# Patient Record
Sex: FEMALE | Race: WHITE | NOT HISPANIC OR LATINO | Employment: OTHER | URBAN - NONMETROPOLITAN AREA
[De-identification: names, ages, dates, MRNs, and addresses within clinical notes are randomized per-mention and may not be internally consistent; named-entity substitution may affect disease eponyms.]

---

## 2021-07-09 ENCOUNTER — APPOINTMENT (OUTPATIENT)
Dept: GENERAL RADIOLOGY | Facility: HOSPITAL | Age: 66
End: 2021-07-09

## 2021-07-09 ENCOUNTER — HOSPITAL ENCOUNTER (OUTPATIENT)
Facility: HOSPITAL | Age: 66
Setting detail: OBSERVATION
Discharge: HOME | End: 2021-07-10
Attending: FAMILY MEDICINE | Admitting: FAMILY MEDICINE

## 2021-07-09 PROBLEM — R07.9 CHEST PAIN: Status: RESOLVED | Noted: 2021-07-09 | Resolved: 2021-07-09

## 2021-07-09 PROBLEM — R77.8 ELEVATED TROPONIN: Status: ACTIVE | Noted: 2021-07-09

## 2021-07-09 PROBLEM — K29.60 REFLUX GASTRITIS: Status: ACTIVE | Noted: 2021-07-09

## 2021-07-09 PROBLEM — R01.1 HEART MURMUR: Status: ACTIVE | Noted: 2021-07-09

## 2021-07-09 PROBLEM — R07.9 CHEST PAIN: Status: ACTIVE | Noted: 2021-07-09

## 2021-07-09 PROBLEM — D64.9 SYMPTOMATIC ANEMIA: Status: ACTIVE | Noted: 2021-07-09

## 2021-07-09 LAB
ALBUMIN SERPL-MCNC: 4 G/DL (ref 3.5–5.2)
ALBUMIN/GLOB SERPL: 1.5 G/DL
ALP SERPL-CCNC: 102 U/L (ref 39–117)
ALT SERPL W P-5'-P-CCNC: 32 U/L (ref 1–33)
ANION GAP SERPL CALCULATED.3IONS-SCNC: 9 MMOL/L (ref 5–15)
AST SERPL-CCNC: 39 U/L (ref 1–32)
BASOPHILS # BLD AUTO: 0.06 10*3/MM3 (ref 0–0.2)
BASOPHILS NFR BLD AUTO: 1 % (ref 0–1.5)
BILIRUB SERPL-MCNC: 0.4 MG/DL (ref 0–1.2)
BUN SERPL-MCNC: 21 MG/DL (ref 8–23)
BUN/CREAT SERPL: 19.8 (ref 7–25)
CALCIUM SPEC-SCNC: 8.9 MG/DL (ref 8.6–10.5)
CHLORIDE SERPL-SCNC: 100 MMOL/L (ref 98–107)
CO2 SERPL-SCNC: 28 MMOL/L (ref 22–29)
CREAT SERPL-MCNC: 1.06 MG/DL (ref 0.57–1)
DEPRECATED RDW RBC AUTO: 52.4 FL (ref 37–54)
EOSINOPHIL # BLD AUTO: 0.12 10*3/MM3 (ref 0–0.4)
EOSINOPHIL NFR BLD AUTO: 2 % (ref 0.3–6.2)
ERYTHROCYTE [DISTWIDTH] IN BLOOD BY AUTOMATED COUNT: 18.4 % (ref 12.3–15.4)
GFR SERPL CREATININE-BSD FRML MDRD: 52 ML/MIN/1.73
GLOBULIN UR ELPH-MCNC: 2.7 GM/DL
GLUCOSE SERPL-MCNC: 85 MG/DL (ref 65–99)
HCT VFR BLD AUTO: 33.2 % (ref 34–46.6)
HGB BLD-MCNC: 10.6 G/DL (ref 12–15.9)
IMM GRANULOCYTES # BLD AUTO: 0.03 10*3/MM3 (ref 0–0.05)
IMM GRANULOCYTES NFR BLD AUTO: 0.5 % (ref 0–0.5)
LYMPHOCYTES # BLD AUTO: 1.46 10*3/MM3 (ref 0.7–3.1)
LYMPHOCYTES NFR BLD AUTO: 24.7 % (ref 19.6–45.3)
MCH RBC QN AUTO: 27.8 PG (ref 26.6–33)
MCHC RBC AUTO-ENTMCNC: 31.9 G/DL (ref 31.5–35.7)
MCV RBC AUTO: 87.1 FL (ref 79–97)
MONOCYTES # BLD AUTO: 0.57 10*3/MM3 (ref 0.1–0.9)
MONOCYTES NFR BLD AUTO: 9.6 % (ref 5–12)
NEUTROPHILS NFR BLD AUTO: 3.67 10*3/MM3 (ref 1.7–7)
NEUTROPHILS NFR BLD AUTO: 62.2 % (ref 42.7–76)
NRBC BLD AUTO-RTO: 0 /100 WBC (ref 0–0.2)
PLATELET # BLD AUTO: 269 10*3/MM3 (ref 140–450)
PMV BLD AUTO: 10.6 FL (ref 6–12)
POTASSIUM SERPL-SCNC: 4.3 MMOL/L (ref 3.5–5.2)
PROT SERPL-MCNC: 6.7 G/DL (ref 6–8.5)
RBC # BLD AUTO: 3.81 10*6/MM3 (ref 3.77–5.28)
SODIUM SERPL-SCNC: 137 MMOL/L (ref 136–145)
TROPONIN T SERPL-MCNC: 0.02 NG/ML (ref 0–0.03)
TROPONIN T SERPL-MCNC: 0.03 NG/ML (ref 0–0.03)
WBC # BLD AUTO: 5.91 10*3/MM3 (ref 3.4–10.8)

## 2021-07-09 PROCEDURE — 85025 COMPLETE CBC W/AUTO DIFF WBC: CPT | Performed by: FAMILY MEDICINE

## 2021-07-09 PROCEDURE — 93010 ELECTROCARDIOGRAM REPORT: CPT | Performed by: INTERNAL MEDICINE

## 2021-07-09 PROCEDURE — G0378 HOSPITAL OBSERVATION PER HR: HCPCS

## 2021-07-09 PROCEDURE — 80053 COMPREHEN METABOLIC PANEL: CPT | Performed by: FAMILY MEDICINE

## 2021-07-09 PROCEDURE — G0379 DIRECT REFER HOSPITAL OBSERV: HCPCS

## 2021-07-09 PROCEDURE — 93005 ELECTROCARDIOGRAM TRACING: CPT | Performed by: FAMILY MEDICINE

## 2021-07-09 PROCEDURE — 99204 OFFICE O/P NEW MOD 45 MIN: CPT | Performed by: INTERNAL MEDICINE

## 2021-07-09 PROCEDURE — 71046 X-RAY EXAM CHEST 2 VIEWS: CPT

## 2021-07-09 PROCEDURE — 84484 ASSAY OF TROPONIN QUANT: CPT | Performed by: FAMILY MEDICINE

## 2021-07-09 RX ORDER — SODIUM CHLORIDE 0.9 % (FLUSH) 0.9 %
10 SYRINGE (ML) INJECTION AS NEEDED
Status: DISCONTINUED | OUTPATIENT
Start: 2021-07-09 | End: 2021-07-10 | Stop reason: HOSPADM

## 2021-07-09 RX ORDER — SODIUM CHLORIDE 0.9 % (FLUSH) 0.9 %
10 SYRINGE (ML) INJECTION EVERY 12 HOURS SCHEDULED
Status: DISCONTINUED | OUTPATIENT
Start: 2021-07-09 | End: 2021-07-10 | Stop reason: HOSPADM

## 2021-07-09 RX ORDER — FERROUS SULFATE 325(65) MG
325 TABLET ORAL
Status: DISCONTINUED | OUTPATIENT
Start: 2021-07-10 | End: 2021-07-10 | Stop reason: HOSPADM

## 2021-07-09 RX ORDER — POTASSIUM CHLORIDE 750 MG/1
10 TABLET, FILM COATED, EXTENDED RELEASE ORAL 2 TIMES DAILY
COMMUNITY
End: 2021-07-10 | Stop reason: HOSPADM

## 2021-07-09 RX ORDER — CLONAZEPAM 0.5 MG/1
0.5 TABLET ORAL 3 TIMES DAILY PRN
Status: DISCONTINUED | OUTPATIENT
Start: 2021-07-09 | End: 2021-07-10 | Stop reason: HOSPADM

## 2021-07-09 RX ORDER — PANTOPRAZOLE SODIUM 40 MG/1
40 TABLET, DELAYED RELEASE ORAL DAILY
COMMUNITY
End: 2021-07-10 | Stop reason: HOSPADM

## 2021-07-09 RX ORDER — ONDANSETRON 2 MG/ML
4 INJECTION INTRAMUSCULAR; INTRAVENOUS EVERY 6 HOURS PRN
Status: DISCONTINUED | OUTPATIENT
Start: 2021-07-09 | End: 2021-07-10 | Stop reason: HOSPADM

## 2021-07-09 RX ORDER — LISINOPRIL 5 MG/1
5 TABLET ORAL DAILY
Status: DISCONTINUED | OUTPATIENT
Start: 2021-07-10 | End: 2021-07-10 | Stop reason: HOSPADM

## 2021-07-09 RX ORDER — LISINOPRIL 5 MG/1
5 TABLET ORAL DAILY
Status: ON HOLD | COMMUNITY
End: 2021-07-10 | Stop reason: SDUPTHER

## 2021-07-09 RX ORDER — FAMOTIDINE 20 MG/1
20 TABLET, FILM COATED ORAL 2 TIMES DAILY
Status: DISCONTINUED | OUTPATIENT
Start: 2021-07-09 | End: 2021-07-10 | Stop reason: HOSPADM

## 2021-07-09 RX ORDER — FERROUS SULFATE 325(65) MG
325 TABLET ORAL
Status: ON HOLD | COMMUNITY
End: 2021-07-10 | Stop reason: SDUPTHER

## 2021-07-09 RX ORDER — FUROSEMIDE 20 MG/1
20 TABLET ORAL 2 TIMES DAILY
COMMUNITY
End: 2021-07-10 | Stop reason: HOSPADM

## 2021-07-09 RX ORDER — ASPIRIN 81 MG/1
81 TABLET ORAL DAILY
Status: DISCONTINUED | OUTPATIENT
Start: 2021-07-09 | End: 2021-07-10 | Stop reason: HOSPADM

## 2021-07-09 RX ORDER — LORATADINE 10 MG/1
10 CAPSULE, LIQUID FILLED ORAL DAILY
COMMUNITY

## 2021-07-09 RX ORDER — CLONAZEPAM 0.5 MG/1
0.5 TABLET ORAL 3 TIMES DAILY PRN
COMMUNITY

## 2021-07-09 RX ADMIN — SODIUM CHLORIDE, PRESERVATIVE FREE 10 ML: 5 INJECTION INTRAVENOUS at 20:34

## 2021-07-09 RX ADMIN — FAMOTIDINE 20 MG: 20 TABLET, FILM COATED ORAL at 20:35

## 2021-07-09 RX ADMIN — METOPROLOL TARTRATE 12.5 MG: 25 TABLET, FILM COATED ORAL at 20:35

## 2021-07-09 NOTE — H&P
[facility] Medicine Services  HISTORY AND PHYSICAL    Date of Admission: 7/9/2021  Primary Care Physician: Provider, No Known    Subjective     Chief Complaint: Chest pain/elevated troponin/anemia    History of Present Illness  Patient is a 66-year-old  female transferred from [facility] for management of chest pain/elevated troponin/anemia.  Patient was accepted by Dr. Torres and were asked to admit the patient.  Status post type and crossmatch and transfuse 2 units that [facility] ER.    Patient stated that the live in  fifth wheels with her  and pets.  Patient is been traveling from [place] to [place] and plans to go to [place] next.  Patient has sign symptom of shortness of breath/weakness.  Patient went to the ER at [facility] to be evaluated, patient has a chronic history anemia.  Hemoglobin ER was 7.1 patient was admitted to the hospital for further evaluation Hemoccult was negative then.  Patient got 2 units of blood.  Patient still remain weak and shortness of breath upon ambulating.  The reason for the transfer of any chest pain/elevated troponin.  Patient currently denies any chest pain or abdomen pain.  Troponin was 0.07 at [facility].    Patient is a chronic smoker smoke a pack a week.    Laboratory showed troponin 0.07, Hemoccult negative, white blood cell 6.1, hemoglobin 7.1- After blood transfusion 10.5., hematocrit 33, platelet 224, sodium is 139, potassium is 3.8, chloride is 103, CO2 is 26, BUN is 19, creatinine is 1.1, GFR is 51, calcium is 9, albumin 3.5, ALT 35, AST 39, total bilirubin .6, total protein 6.2, PTT 22, PT 12, INR is 1.14, .  pH 7.41, PCO2 43, PO2 72, HCO3 27.    Covid-19-not detected.  CTA of the chest-no mediastinal or hilar or axillary lymph node, calcified granuloma in the right lower lobe, lung otherwise clear with no addition pulmonary nodule or mass consolidation or infiltrate or pleural  effusion or pneumothorax.   CT scan abdomen pelvic-image of abdomen pelvic-liver and gallbladder and kidneys and adrenal and spleen and pancreas have normal CT appearance, aortic containing atherosclerotic calcification without evidence of aneurysm, uterus is surgically absent, no dilated loop of small bowel, no free air or abscess or free fluid.  Chest x-ray-no acute CHF with minimally bilateral pleural effusion.  Ultrasound evaluation urinary bladder-normal ultrasound evaluation urinary bladder.    Review of Systems   Constitutional: Positive for activity change, appetite change and fatigue. Negative for chills and fever.   HENT: Negative for hearing loss, nosebleeds, tinnitus and trouble swallowing.    Eyes: Negative for visual disturbance.   Respiratory: Negative for cough, chest tightness, shortness of breath and wheezing.    Cardiovascular: Negative for chest pain, palpitations and leg swelling.   Gastrointestinal: Negative for abdominal distention, abdominal pain, blood in stool, constipation, diarrhea, nausea and vomiting.   Endocrine: Negative for cold intolerance, heat intolerance, polydipsia, polyphagia and polyuria.   Genitourinary: Negative for decreased urine volume, difficulty urinating, dysuria, flank pain, frequency and hematuria.   Musculoskeletal: Negative for arthralgias, joint swelling and myalgias.   Skin: Negative for rash.   Allergic/Immunologic: Negative for immunocompromised state.   Neurological: Negative for dizziness, syncope, weakness, light-headedness and headaches.   Hematological: Negative for adenopathy. Does not bruise/bleed easily.   Psychiatric/Behavioral: Negative for confusion and sleep disturbance. The patient is not nervous/anxious.         Otherwise complete ROS reviewed and negative except as mentioned in the HPI.      Past Medical History:   Past Medical History:   Diagnosis Date   • Anemia    • Anxiety    • CHF (congestive heart failure) (CMS/HCC)    • GERD  "(gastroesophageal reflux disease)    • History of transfusion    • Hypertension        Past Surgical History:  Past Surgical History:   Procedure Laterality Date   • HYSTERECTOMY         Family History: family history includes Neurodegenerative disease in his mother; No Known Problems in his father.    Social History:  reports that he has been smoking. He has been smoking about 0.50 packs per day. He does not have any smokeless tobacco history on file. He reports current alcohol use. He reports that he does not use drugs.    Medications:  Prior to Admission medications    Medication Sig Start Date End Date Taking? Authorizing Provider   clonazePAM (KlonoPIN) 0.5 MG tablet Take 0.5 mg by mouth 3 (Three) Times a Day As Needed.   Yes Ara Cartagena MD   ferrous sulfate 325 (65 FE) MG tablet Take 325 mg by mouth Daily With Breakfast.   Yes Ara Cartagena MD   furosemide (LASIX) 20 MG tablet Take 20 mg by mouth 2 (Two) Times a Day.   Yes Ara Cartagena MD   lisinopril (PRINIVIL,ZESTRIL) 5 MG tablet Take 5 mg by mouth Daily.   Yes Ara Cartagena MD   Loratadine 10 MG capsule Take 10 mg by mouth Daily.   Yes Ara Cartagena MD   metoprolol tartrate (LOPRESSOR) 25 MG tablet Take 25 mg by mouth 2 (Two) Times a Day.   Yes Ara Cartagena MD   pantoprazole (PROTONIX) 40 MG EC tablet Take 40 mg by mouth Daily.   Yes Ara Cartagena MD   potassium chloride 10 MEQ CR tablet Take 10 mEq by mouth 2 (Two) Times a Day.   Yes Ara Cartagena MD     Allergies:  No Known Allergies    Objective     Vital Signs: /52 (BP Location: Right arm, Patient Position: Lying)   Pulse 72   Temp 97.6 °F (36.4 °C) (Oral)   Resp 18   Ht 160 cm (63\")   Wt 62.8 kg (138 lb 6.4 oz)   SpO2 96%   BMI 24.52 kg/m²   Physical Exam  Vitals and nursing note reviewed.   Constitutional:       Appearance: He is well-developed.   HENT:      Head: Normocephalic and atraumatic.   Eyes:      " Conjunctiva/sclera: Conjunctivae normal.      Pupils: Pupils are equal, round, and reactive to light.   Neck:      Vascular: No JVD.   Cardiovascular:      Rate and Rhythm: Normal rate and regular rhythm.      Heart sounds: Murmur heard.   No friction rub. No gallop.    Pulmonary:      Effort: No respiratory distress.      Breath sounds: No wheezing or rales.      Comments: Decrease in breath sound bilateral.  Chest:      Chest wall: No tenderness.   Abdominal:      General: Bowel sounds are normal. There is no distension.      Palpations: Abdomen is soft.      Tenderness: There is no abdominal tenderness. There is no guarding or rebound.   Musculoskeletal:         General: No tenderness or deformity.      Cervical back: Neck supple.   Skin:     General: Skin is warm and dry.      Capillary Refill: Capillary refill takes 2 to 3 seconds.      Findings: No rash.   Neurological:      Mental Status: He is alert and oriented to person, place, and time.      Cranial Nerves: No cranial nerve deficit.      Motor: Weakness present. No abnormal muscle tone.      Deep Tendon Reflexes: Reflexes normal.   Psychiatric:         Behavior: Behavior normal.         Thought Content: Thought content normal.         Judgment: Judgment normal.             Results Reviewed:    Lab Results (last 24 hours)     ** No results found for the last 24 hours. **           Radiology Data:    Imaging Results (Last 24 Hours)     ** No results found for the last 24 hours. **          I have personally reviewed and interpreted the radiology studies and ECG obtained at time of admission.     Assessment / Plan      Assessment & Plan  Active Hospital Problems    Diagnosis    • Elevated troponin    • Heart murmur    • Symptomatic anemia    • Reflux gastritis      Plan.    Chest pain/shortness of breath/chronic heart murmur/hypertension.  Slightly elevated troponin.  We will continue to trend troponin.  Consult cardiology, reason for transfer, in a.m.   Patient currently denies any chest pain.  Hold Lasix.  Continue low-dose lisinopril.  Continue Lopressor.  Echocardiogram.    Shortness of breath.  Patient currently denies any shortness of breath.  CTA of the chest from [facility] -no mediastinal or hilar or axillary lymph node, calcified granuloma in the right lower lobe, lung otherwise clear with no addition pulmonary nodule or mass consolidation or infiltrate or pleural effusion or pneumothorax.   Chest x-ray from [facility] -no acute CHF with minimally bilateral pleural effusion.  Repeat chest x-ray.    Anemia.  Status post 2 units of blood transfusion 7/7/2021.  No sign of acute bleed.  Hemoccult at [facility] was negative.  Continue iron sulfate.    Abdomen pain/epigastric pain.  Pepcid.  Zofran as needed  CT scan abdomen pelvic from [facility] -image of abdomen pelvic-liver and gallbladder and kidneys and adrenal and spleen and pancreas have normal CT appearance, aortic containing atherosclerotic calcification without evidence of aneurysm, uterus is surgically absent, no dilated loop of small bowel, no free air or abscess or free fluid.  Ultrasound evaluation urinary bladder from [facility] -normal ultrasound evaluation urinary bladder.    Chronic smoker.  Discussed with patient cutting back and stopping.  Nicotine patch for now.    Anxiety.  Clonazepam as needed.    Stat CBC and CMP.  Lab in a.m.  Trend troponin.  Chest x-ray.  Echocardiogram.    Code Status: Full code.  If patient unable make it medical decision her  Sander will make it for her.     I discussed the patient's findings and my recommendations with: Patient.    Estimated length of stay: 1 to 3 days.    Electronically signed by Jorge Ahuja MD, 07/09/21, 4:23 PM CDT.

## 2021-07-09 NOTE — PLAN OF CARE
Goal Outcome Evaluation:  Plan of Care Reviewed With: patient        Progress: no change  Outcome Summary: Pt tranfered from [facility]. She co chest pressure and had abnormal EKG, trop was 0.07. her hgb was 7.1 on admit and she recived 2 units of blood, it is now 10.5. OB stool was neg. cont to monitor.

## 2021-07-09 NOTE — CONSULTS
Consults   Reason for Consultation: Chest pain/elevated troponin.  Reason for transfer.  Requesting Physician: Jorge Ahuja MD    [facility] - Consultation Note    Chief Complaint: Anemia and shortness of breath    HPI: This is a 66-year-old female with a history of valvular heart disease, including mitral valve regurgitation, mitral valve stenosis, aortic valve stenosis, all of mild or mild to moderate, also with a history of severe transfusion dependent anemia, transferred from an outside facility after the transferring provider found an elevated troponin on the labs at this morning.  The patient tells me that she went to an outside hospital because that she knew that she was anemic.  She says that over a period of days or even the past few weeks, she has been more short of breath than usual and having some weakness.  She says that the symptoms got bad enough that she sought medical care.  In the emergency department at the outside hospital, she was found to be anemic and was given 2 units of blood.  The patient still remained weak and somewhat short of breath.  Therefore, a troponin level was checked by the admitting physician at the outside hospital and found to be slightly elevated, therefore I was initially called for a transfer for this patient.  I was told by the referring provider that she was not having any chest pain.  He also said that there has been nothing else found on her work-up other than an elevated troponin of any clinical significance.  Initially, I accepted the patient, however I did question the physician about the checking of a troponin simply for shortness of breath, especially after the patient had just been administered 2 units of blood.  Ultimately, the admission was changed to the hospitalist service.  We are now asked to see the patient.    The patient absolutely denies that she has had any chest pain or chest pressure at any point in time.  She says that she is still short of  breath with exertion but she also tells me that her hemoglobin level still remains lower than what she describes as being her baseline although she cannot recall the exact number of her baseline hemoglobin.  She says that she has noted some melena in the past but not any in the past few days.  She denies nausea, vomiting or abdominal pain.  No orthopnea, PND, edema.  She denies cough or wheezing.  She does smoke about a pack of cigarettes per week and says that occasionally she will have a cough but nothing recently.  She denies having any fevers or chills.    She says that she is currently traveling about the country with her .  She says that when she was living in [place], she had blood transfusion scheduled.  She denies a knowledge of a definitive reason why she is transfusion dependent.  She says that she thinks that she does not make the proper amount of red blood cells.  She does take iron and she says that this sometimes upsets her stomach.  She says that she takes this with food and it seems to do better.    Once again, it should be noted that this patient denies having any chest pain now or during her recent hospital stay at the outside facility.    Past Medical History:   Diagnosis Date   • Anemia    • Anxiety    • CHF (congestive heart failure) (CMS/[facility])    • GERD (gastroesophageal reflux disease)    • History of transfusion    • Hypertension    • Valvular heart disease      Past Surgical History:   Procedure Laterality Date   • HYSTERECTOMY       Prior to Admission medications    Medication Sig Start Date End Date Taking? Authorizing Provider   clonazePAM (KlonoPIN) 0.5 MG tablet Take 0.5 mg by mouth 3 (Three) Times a Day As Needed.   Yes Ara Cartagena MD   ferrous sulfate 325 (65 FE) MG tablet Take 325 mg by mouth Daily With Breakfast.   Yes Ara Cartagena MD   furosemide (LASIX) 20 MG tablet Take 20 mg by mouth 2 (Two) Times a Day.   Yes Ara Cartagena MD   lisinopril  (PRINIVIL,ZESTRIL) 5 MG tablet Take 5 mg by mouth Daily.   Yes Ara Cartagena MD   Loratadine 10 MG capsule Take 10 mg by mouth Daily.   Yes Ara Cartagena MD   metoprolol tartrate (LOPRESSOR) 25 MG tablet Take 25 mg by mouth 2 (Two) Times a Day.   Yes Ara Cartagena MD   pantoprazole (PROTONIX) 40 MG EC tablet Take 40 mg by mouth Daily.   Yes Ara Cartagena MD   potassium chloride 10 MEQ CR tablet Take 10 mEq by mouth 2 (Two) Times a Day.   Yes Ara Cartagena MD     Allergies: No Known Allergies    Social History     Tobacco Use   • Smoking status: Current Every Day Smoker     Packs/day: 0.50   Substance Use Topics   • Alcohol use: Yes     Family History   Problem Relation Age of Onset   • Neurodegenerative disease Mother    • No Known Problems Father      Review of Systems   Constitutional: Positive for malaise/fatigue. Negative for chills, fever and weight loss.   HENT: Negative for congestion and hearing loss.    Eyes: Negative for blurred vision and pain.   Cardiovascular: Positive for dyspnea on exertion. Negative for chest pain, claudication, leg swelling, orthopnea, palpitations, paroxysmal nocturnal dyspnea and syncope.   Respiratory: Positive for shortness of breath. Negative for cough, hemoptysis and wheezing.    Endocrine: Negative for cold intolerance and heat intolerance.   Hematologic/Lymphatic: Negative for adenopathy and bleeding problem.   Skin: Negative for color change, poor wound healing and rash.   Musculoskeletal: Negative for arthritis, myalgias and neck pain.   Gastrointestinal: Positive for melena. Negative for abdominal pain, change in bowel habit, constipation, diarrhea, heartburn, hematochezia, nausea and vomiting.   Genitourinary: Negative for dysuria, frequency, hematuria and nocturia.   Neurological: Positive for weakness. Negative for dizziness, focal weakness, headaches, light-headedness, loss of balance and numbness.   Psychiatric/Behavioral:  "Negative for altered mental status, memory loss and substance abuse.   Allergic/Immunologic: Negative for hives and persistent infections.     Physical Exam:    /52 (BP Location: Right arm, Patient Position: Lying)   Pulse 72   Temp 97.6 °F (36.4 °C) (Oral)   Resp 18   Ht 160 cm (63\")   Wt 62.8 kg (138 lb 6.4 oz)   SpO2 96%   BMI 24.52 kg/m²   Temp:  [97.6 °F (36.4 °C)] 97.6 °F (36.4 °C)  Heart Rate:  [72] 72  Resp:  [18] 18  BP: (114)/(52) 114/52    Vitals reviewed.   Constitutional:       General: Not in acute distress.     Appearance: Normal and healthy appearance. Well-developed. Not toxic-appearing or diaphoretic.   Eyes:      General: Lids are normal.      Extraocular Movements: Extraocular movements intact.      Pupils: Pupils are equal, round, and reactive to light.   HENT:      Head: Normocephalic and atraumatic.      Right Ear: External ear normal.      Left Ear: External ear normal.      Nose: Nose normal.    Mouth/Throat:      Mouth: Mucous membranes are not pale, not dry and not cyanotic.      Pharynx: Uvula midline. No oropharyngeal exudate.   Neck:      Thyroid: No thyroid mass or thyromegaly.      Vascular: No carotid bruit, hepatojugular reflux or JVD.      Trachea: No tracheal deviation.      Lymphadenopathy: No cervical adenopathy.   Pulmonary:      Effort: Pulmonary effort is normal. No accessory muscle usage or respiratory distress.      Breath sounds: Decreased breath sounds present.      Comments: Decreased bilaterally but otherwise clear without wheezes, rhonchi, rales  Chest:      Chest wall: Not tender to palpatation.   Cardiovascular:      Normal rate. Regular rhythm.      Murmurs: There is a grade 2/6 high frequency blowing holosystolic murmur at the apex. There is also a grade 2/6 harsh midsystolic murmur at the URSB.      No gallop.   Pulses:     Intact distal pulses.   Edema:     Peripheral edema absent.   Abdominal:      General: Bowel sounds are normal. There is no " distension or abdominal bruit.      Palpations: Abdomen is soft. There is no pulsatile midline mass.      Tenderness: There is no abdominal tenderness.   Musculoskeletal: Normal range of motion.         General: No tenderness or deformity.      Extremities: No clubbing present.     Cervical back: Normal range of motion and neck supple. No edema. Skin:     General: Skin is warm and dry. There is no cyanosis.      Findings: No erythema or rash.   Neurological:      General: No focal deficit present.      Mental Status: Oriented to person, place, and time and oriented to person, place and time.      Cranial Nerves: No cranial nerve deficit.   Psychiatric:         Attention and Perception: Attention normal.         Mood and Affect: Mood normal.         Speech: Speech normal.         Behavior: Behavior normal. Behavior is cooperative.         Thought Content: Thought content normal.       Diagnostic Data:    Troponin at outside hospital of 0.07 with an upper limits of normal on this assay of 0.03  ECG from outside hospital: Sinus rhythm, ventricular rate 65, LVH with repolarization abnormality, otherwise no acute ST changes    No other labs available to me at this time    ASSESSMENT/PLAN:    1.  Elevated troponin at outside hospital  2.  Shortness of breath  3.  Acute on chronic anemia  4.  Valvular heart disease: Mitral valve regurgitation, mitral valve stenosis, aortic valve stenosis  5.  Tobacco abuse    -We are asked to see this patient regarding chest pain, elevated troponin.  The patient absolutely denies having any chest pain.  This is not a problem for the patient at this time and has been taken off the patient's problem list for the hospitalization.    -She does have shortness of breath and dyspnea on exertion but also is still anemic.  She does not admit to any obvious blood loss while hospitalized.  I was told that her hemoglobin level is morning was 10 and had previously been 7 upon her arrival at the outside  "facility.  The transferring provider told me that this morning he was planning to send her home and because of her shortness of breath, he simply checked a troponin to \"start a cardiac work-up\".  Once again, the patient does not have any chest pain and her shortness of breath is no worse than it was when she arrived at the outside hospital, per her report.  Her exam is otherwise unremarkable with clear lungs and cardiac murmurs that are consistent with her known history of valvular heart disease.  Therefore, while the troponin level was elevated, the significance of this is unclear to me.  Certainly, with a hemoglobin as low as 7, this may have caused some supply demand mismatch sort of issue which led to an elevated troponin level.  However in this particular clinical situation, I am not certain that a troponin level alone would be an appropriate work-up for a patient who simply only complains of shortness of breath.  In any event, I am sure this will be trended here.  Unless the patient develops chest pain, hemodynamic instability or some extreme rise in troponin levels, I would not consider this a primary cardiac cause for her troponin elevation such as a myocardial infarction, and no further cardiac work-up would be indicated.  -Blood levels will certainly be checked here as well along with electrolytes, etc.  These also may be helpful in determining the reason for the patient shortness of breath.  -An echocardiogram has been ordered by the primary service.  The patient is known to have cardiac murmurs due to known valvular issues.  However, once again, the patient does not complain of chest discomfort, which is one of the reasons for the exam being ordered.  In addition to that, while she is short of breath at this time, clinically, she does not appear to be in heart failure as she has clear lungs at this time.  If anything, 2 units of blood may have led to some degree of volume overload in the setting of her " mitral valve stenosis, etc., however if the primary service feels that an echocardiogram is ordered we will follow up the results of this.  -Chest x-ray has been ordered which seems very reasonable.  -We will follow up the patient's troponin levels along with other labs and echocardiogram and comment further if there is any immediate cardiac needs.

## 2021-07-10 ENCOUNTER — APPOINTMENT (OUTPATIENT)
Dept: CARDIOLOGY | Facility: HOSPITAL | Age: 66
End: 2021-07-10

## 2021-07-10 VITALS
RESPIRATION RATE: 18 BRPM | BODY MASS INDEX: 24.66 KG/M2 | HEIGHT: 63 IN | HEART RATE: 57 BPM | TEMPERATURE: 97.6 F | WEIGHT: 139.2 LBS | OXYGEN SATURATION: 94 % | SYSTOLIC BLOOD PRESSURE: 130 MMHG | DIASTOLIC BLOOD PRESSURE: 71 MMHG

## 2021-07-10 LAB
ALBUMIN SERPL-MCNC: 3.6 G/DL (ref 3.5–5.2)
ALBUMIN/GLOB SERPL: 1.5 G/DL
ALP SERPL-CCNC: 78 U/L (ref 39–117)
ALT SERPL W P-5'-P-CCNC: 28 U/L (ref 1–33)
ANION GAP SERPL CALCULATED.3IONS-SCNC: 5 MMOL/L (ref 5–15)
AST SERPL-CCNC: 35 U/L (ref 1–32)
BASOPHILS # BLD AUTO: 0.05 10*3/MM3 (ref 0–0.2)
BASOPHILS NFR BLD AUTO: 1 % (ref 0–1.5)
BH CV ECHO MEAS - AO MAX PG (FULL): 11.6 MMHG
BH CV ECHO MEAS - AO MAX PG: 26 MMHG
BH CV ECHO MEAS - AO MEAN PG (FULL): 2 MMHG
BH CV ECHO MEAS - AO MEAN PG: 12 MMHG
BH CV ECHO MEAS - AO ROOT AREA (BSA CORRECTED): 1.4
BH CV ECHO MEAS - AO ROOT AREA: 4.5 CM^2
BH CV ECHO MEAS - AO ROOT DIAM: 2.4 CM
BH CV ECHO MEAS - AO V2 MAX: 255 CM/SEC
BH CV ECHO MEAS - AO V2 MEAN: 155.5 CM/SEC
BH CV ECHO MEAS - AO V2 VTI: 54.6 CM
BH CV ECHO MEAS - AVA(I,A): 1.3 CM^2
BH CV ECHO MEAS - AVA(I,D): 1.3 CM^2
BH CV ECHO MEAS - AVA(V,A): 1.3 CM^2
BH CV ECHO MEAS - AVA(V,D): 1.3 CM^2
BH CV ECHO MEAS - BSA(HAYCOCK): 1.7 M^2
BH CV ECHO MEAS - BSA: 1.7 M^2
BH CV ECHO MEAS - BZI_BMI: 24.6 KILOGRAMS/M^2
BH CV ECHO MEAS - BZI_METRIC_HEIGHT: 160 CM
BH CV ECHO MEAS - BZI_METRIC_WEIGHT: 63.1 KG
BH CV ECHO MEAS - EDV(CUBED): 57.1 ML
BH CV ECHO MEAS - EDV(MOD-SP2): 81.6 ML
BH CV ECHO MEAS - EDV(MOD-SP4): 102 ML
BH CV ECHO MEAS - EDV(TEICH): 63.9 ML
BH CV ECHO MEAS - EF(CUBED): 61.9 %
BH CV ECHO MEAS - EF(MOD-SP2): 83.9 %
BH CV ECHO MEAS - EF(MOD-SP4): 74.8 %
BH CV ECHO MEAS - EF(TEICH): 54.2 %
BH CV ECHO MEAS - ESV(CUBED): 21.7 ML
BH CV ECHO MEAS - ESV(MOD-SP2): 13.1 ML
BH CV ECHO MEAS - ESV(MOD-SP4): 25.7 ML
BH CV ECHO MEAS - ESV(TEICH): 29.3 ML
BH CV ECHO MEAS - FS: 27.5 %
BH CV ECHO MEAS - IVS/LVPW: 1
BH CV ECHO MEAS - IVSD: 1.3 CM
BH CV ECHO MEAS - LA DIMENSION: 3.5 CM
BH CV ECHO MEAS - LA/AO: 1.5
BH CV ECHO MEAS - LAT PEAK E' VEL: 3 CM/SEC
BH CV ECHO MEAS - LV DIASTOLIC VOL/BSA (35-75): 61.6 ML/M^2
BH CV ECHO MEAS - LV MASS(C)D: 164.1 GRAMS
BH CV ECHO MEAS - LV MASS(C)DI: 99.1 GRAMS/M^2
BH CV ECHO MEAS - LV MAX PG: 14.4 MMHG
BH CV ECHO MEAS - LV MEAN PG: 10 MMHG
BH CV ECHO MEAS - LV SYSTOLIC VOL/BSA (12-30): 15.5 ML/M^2
BH CV ECHO MEAS - LV V1 MAX: 190 CM/SEC
BH CV ECHO MEAS - LV V1 MEAN: 149 CM/SEC
BH CV ECHO MEAS - LV V1 VTI: 40.9 CM
BH CV ECHO MEAS - LVIDD: 3.9 CM
BH CV ECHO MEAS - LVIDS: 2.8 CM
BH CV ECHO MEAS - LVLD AP2: 7.2 CM
BH CV ECHO MEAS - LVLD AP4: 7.3 CM
BH CV ECHO MEAS - LVLS AP2: 5.4 CM
BH CV ECHO MEAS - LVLS AP4: 6.1 CM
BH CV ECHO MEAS - LVOT AREA (M): 1.8 CM^2
BH CV ECHO MEAS - LVOT AREA: 1.8 CM^2
BH CV ECHO MEAS - LVOT DIAM: 1.5 CM
BH CV ECHO MEAS - LVPWD: 1.2 CM
BH CV ECHO MEAS - MED PEAK E' VEL: 3.99 CM/SEC
BH CV ECHO MEAS - MR MAX PG: 123.4 MMHG
BH CV ECHO MEAS - MR MAX VEL: 554.3 CM/SEC
BH CV ECHO MEAS - MR MEAN PG: 72 MMHG
BH CV ECHO MEAS - MR MEAN VEL: 394 CM/SEC
BH CV ECHO MEAS - MR VTI: 187 CM
BH CV ECHO MEAS - MV A MAX VEL: 117 CM/SEC
BH CV ECHO MEAS - MV DEC TIME: 0.34 SEC
BH CV ECHO MEAS - MV E MAX VEL: 150 CM/SEC
BH CV ECHO MEAS - MV E/A: 1.3
BH CV ECHO MEAS - RAP SYSTOLE: 5 MMHG
BH CV ECHO MEAS - RVSP: 43.2 MMHG
BH CV ECHO MEAS - SI(AO): 149.1 ML/M^2
BH CV ECHO MEAS - SI(CUBED): 21.3 ML/M^2
BH CV ECHO MEAS - SI(LVOT): 43.6 ML/M^2
BH CV ECHO MEAS - SI(MOD-SP2): 41.3 ML/M^2
BH CV ECHO MEAS - SI(MOD-SP4): 46.1 ML/M^2
BH CV ECHO MEAS - SI(TEICH): 20.9 ML/M^2
BH CV ECHO MEAS - SV(AO): 247 ML
BH CV ECHO MEAS - SV(CUBED): 35.3 ML
BH CV ECHO MEAS - SV(LVOT): 72.3 ML
BH CV ECHO MEAS - SV(MOD-SP2): 68.5 ML
BH CV ECHO MEAS - SV(MOD-SP4): 76.3 ML
BH CV ECHO MEAS - SV(TEICH): 34.6 ML
BH CV ECHO MEAS - TR MAX VEL: 309 CM/SEC
BH CV ECHO MEASUREMENTS AVERAGE E/E' RATIO: 42.92
BILIRUB SERPL-MCNC: 0.4 MG/DL (ref 0–1.2)
BUN SERPL-MCNC: 24 MG/DL (ref 8–23)
BUN/CREAT SERPL: 24 (ref 7–25)
CALCIUM SPEC-SCNC: 8.7 MG/DL (ref 8.6–10.5)
CHLORIDE SERPL-SCNC: 102 MMOL/L (ref 98–107)
CHOLEST SERPL-MCNC: 147 MG/DL (ref 0–200)
CO2 SERPL-SCNC: 31 MMOL/L (ref 22–29)
CREAT SERPL-MCNC: 1 MG/DL (ref 0.57–1)
DEPRECATED RDW RBC AUTO: 56.2 FL (ref 37–54)
EOSINOPHIL # BLD AUTO: 0.19 10*3/MM3 (ref 0–0.4)
EOSINOPHIL NFR BLD AUTO: 3.8 % (ref 0.3–6.2)
ERYTHROCYTE [DISTWIDTH] IN BLOOD BY AUTOMATED COUNT: 18.3 % (ref 12.3–15.4)
GFR SERPL CREATININE-BSD FRML MDRD: 55 ML/MIN/1.73
GLOBULIN UR ELPH-MCNC: 2.4 GM/DL
GLUCOSE SERPL-MCNC: 100 MG/DL (ref 65–99)
HBA1C MFR BLD: 4.9 % (ref 4.8–5.6)
HCT VFR BLD AUTO: 31.7 % (ref 34–46.6)
HDLC SERPL-MCNC: 47 MG/DL (ref 40–60)
HGB BLD-MCNC: 9.8 G/DL (ref 12–15.9)
IMM GRANULOCYTES # BLD AUTO: 0.02 10*3/MM3 (ref 0–0.05)
IMM GRANULOCYTES NFR BLD AUTO: 0.4 % (ref 0–0.5)
LDLC SERPL CALC-MCNC: 83 MG/DL (ref 0–100)
LDLC/HDLC SERPL: 1.74 {RATIO}
LEFT ATRIUM VOLUME INDEX: 62.7 ML/M2
LEFT ATRIUM VOLUME: 104 CM3
LYMPHOCYTES # BLD AUTO: 1.35 10*3/MM3 (ref 0.7–3.1)
LYMPHOCYTES NFR BLD AUTO: 27.1 % (ref 19.6–45.3)
MAXIMAL PREDICTED HEART RATE: 154 BPM
MCH RBC QN AUTO: 27.3 PG (ref 26.6–33)
MCHC RBC AUTO-ENTMCNC: 30.9 G/DL (ref 31.5–35.7)
MCV RBC AUTO: 88.3 FL (ref 79–97)
MONOCYTES # BLD AUTO: 0.61 10*3/MM3 (ref 0.1–0.9)
MONOCYTES NFR BLD AUTO: 12.2 % (ref 5–12)
NEUTROPHILS NFR BLD AUTO: 2.77 10*3/MM3 (ref 1.7–7)
NEUTROPHILS NFR BLD AUTO: 55.5 % (ref 42.7–76)
NRBC BLD AUTO-RTO: 0 /100 WBC (ref 0–0.2)
PLATELET # BLD AUTO: 215 10*3/MM3 (ref 140–450)
PMV BLD AUTO: 9.6 FL (ref 6–12)
POTASSIUM SERPL-SCNC: 4.8 MMOL/L (ref 3.5–5.2)
PROT SERPL-MCNC: 6 G/DL (ref 6–8.5)
RBC # BLD AUTO: 3.59 10*6/MM3 (ref 3.77–5.28)
SODIUM SERPL-SCNC: 138 MMOL/L (ref 136–145)
STRESS TARGET HR: 131 BPM
TRIGL SERPL-MCNC: 90 MG/DL (ref 0–150)
TSH SERPL DL<=0.05 MIU/L-ACNC: 1.87 UIU/ML (ref 0.27–4.2)
VLDLC SERPL-MCNC: 17 MG/DL (ref 5–40)
WBC # BLD AUTO: 4.99 10*3/MM3 (ref 3.4–10.8)

## 2021-07-10 PROCEDURE — 94799 UNLISTED PULMONARY SVC/PX: CPT

## 2021-07-10 PROCEDURE — 80053 COMPREHEN METABOLIC PANEL: CPT | Performed by: FAMILY MEDICINE

## 2021-07-10 PROCEDURE — 85025 COMPLETE CBC W/AUTO DIFF WBC: CPT | Performed by: FAMILY MEDICINE

## 2021-07-10 PROCEDURE — 83036 HEMOGLOBIN GLYCOSYLATED A1C: CPT | Performed by: FAMILY MEDICINE

## 2021-07-10 PROCEDURE — 93306 TTE W/DOPPLER COMPLETE: CPT

## 2021-07-10 PROCEDURE — 80061 LIPID PANEL: CPT | Performed by: FAMILY MEDICINE

## 2021-07-10 PROCEDURE — 93306 TTE W/DOPPLER COMPLETE: CPT | Performed by: INTERNAL MEDICINE

## 2021-07-10 PROCEDURE — 94640 AIRWAY INHALATION TREATMENT: CPT

## 2021-07-10 PROCEDURE — 99214 OFFICE O/P EST MOD 30 MIN: CPT | Performed by: NURSE PRACTITIONER

## 2021-07-10 PROCEDURE — G0378 HOSPITAL OBSERVATION PER HR: HCPCS

## 2021-07-10 PROCEDURE — 25010000002 PERFLUTREN 6.52 MG/ML SUSPENSION: Performed by: FAMILY MEDICINE

## 2021-07-10 PROCEDURE — 84443 ASSAY THYROID STIM HORMONE: CPT | Performed by: FAMILY MEDICINE

## 2021-07-10 RX ORDER — IPRATROPIUM BROMIDE AND ALBUTEROL SULFATE 2.5; .5 MG/3ML; MG/3ML
3 SOLUTION RESPIRATORY (INHALATION)
Qty: 360 ML | Refills: 12 | Status: SHIPPED | OUTPATIENT
Start: 2021-07-10

## 2021-07-10 RX ORDER — LISINOPRIL 5 MG/1
5 TABLET ORAL DAILY
Qty: 90 TABLET | Refills: 0 | Status: SHIPPED | OUTPATIENT
Start: 2021-07-10

## 2021-07-10 RX ORDER — IPRATROPIUM BROMIDE AND ALBUTEROL SULFATE 2.5; .5 MG/3ML; MG/3ML
3 SOLUTION RESPIRATORY (INHALATION)
Status: DISCONTINUED | OUTPATIENT
Start: 2021-07-10 | End: 2021-07-10 | Stop reason: HOSPADM

## 2021-07-10 RX ORDER — FAMOTIDINE 20 MG/1
20 TABLET, FILM COATED ORAL 2 TIMES DAILY
Qty: 120 TABLET | Refills: 2 | Status: SHIPPED | OUTPATIENT
Start: 2021-07-10

## 2021-07-10 RX ORDER — ASPIRIN 81 MG/1
81 TABLET ORAL DAILY
Qty: 90 TABLET | Refills: 1 | Status: SHIPPED | OUTPATIENT
Start: 2021-07-11

## 2021-07-10 RX ORDER — FERROUS SULFATE 325(65) MG
325 TABLET ORAL
Qty: 90 TABLET | Refills: 0 | Status: SHIPPED | OUTPATIENT
Start: 2021-07-10

## 2021-07-10 RX ADMIN — FAMOTIDINE 20 MG: 20 TABLET, FILM COATED ORAL at 08:34

## 2021-07-10 RX ADMIN — LISINOPRIL 5 MG: 5 TABLET ORAL at 08:34

## 2021-07-10 RX ADMIN — METOPROLOL TARTRATE 12.5 MG: 25 TABLET, FILM COATED ORAL at 08:34

## 2021-07-10 RX ADMIN — NICOTINE 1 PATCH: 7 PATCH, EXTENDED RELEASE TRANSDERMAL at 08:35

## 2021-07-10 RX ADMIN — PERFLUTREN 1.17 MG: 6.52 INJECTION, SUSPENSION INTRAVENOUS at 09:46

## 2021-07-10 RX ADMIN — ASPIRIN 81 MG: 81 TABLET, COATED ORAL at 08:34

## 2021-07-10 RX ADMIN — IPRATROPIUM BROMIDE AND ALBUTEROL SULFATE 3 ML: 2.5; .5 SOLUTION RESPIRATORY (INHALATION) at 10:52

## 2021-07-10 RX ADMIN — IPRATROPIUM BROMIDE AND ALBUTEROL SULFATE 3 ML: 2.5; .5 SOLUTION RESPIRATORY (INHALATION) at 15:52

## 2021-07-10 RX ADMIN — FERROUS SULFATE TAB 325 MG (65 MG ELEMENTAL FE) 325 MG: 325 (65 FE) TAB at 08:34

## 2021-07-10 NOTE — DISCHARGE SUMMARY
[facility]  DISCHARGE SUMMARY       Date of Admission: 7/9/2021  Date of Discharge:  7/10/2021  Primary Care Physician: Provider, No Known    Presenting Problem/History of Present Illness:  Chest pain [R07.9]     Final Discharge Diagnoses:  Active Hospital Problems    Diagnosis    • Elevated troponin    • Heart murmur    • Symptomatic anemia    • Reflux gastritis        Consults: Cardiology.    Pertinent Test Results:   Results for orders placed during the hospital encounter of 07/09/21    Adult Transthoracic Echo Complete W/ Cont if Necessary Per Protocol    Interpretation Summary  · Left ventricular systolic function is normal. Left ventricular ejection fraction appears to be 61 - 65%.  · Left ventricular wall thickness is consistent with moderate concentric hypertrophy.  · Normal right ventricular cavity size and systolic function noted.  · Mild to moderate aortic valve stenosis is present.  · Moderate mitral valve regurgitation is present.  · Trace tricuspid valve regurgitation is present. Estimated right ventricular systolic pressure from tricuspid regurgitation is mildly elevated (35-45 mmHg).    IMPRESSION: Chest x-ray.  Mild bronchial wall thickening may reflect acute or chronic  bronchitis. No evidence of pneumonia.    Chief Complaint on Day of Discharge: None.    History of Present Illness on Day of Discharge:   Patient is a 66-year-old  female transferred from [facility] for management of chest pain/elevated troponin/anemia.  Patient was accepted by Dr. Torres and were asked to admit the patient.  Status post type and crossmatch and transfuse 2 units that [facility] ER.     Patient stated that the live in  fifth wheels with her  and pets.  Patient is been traveling from [place] to [place] and plans to go to [place] next.  Patient has sign symptom of shortness of breath/weakness.  Patient went to the ER at [facility]  to be evaluated, patient has a chronic history anemia.  Hemoglobin ER was 7.1 patient was admitted to the hospital for further evaluation Hemoccult was negative then.  Patient got 2 units of blood.  Patient still remain weak and shortness of breath upon ambulating.  The reason for the transfer of any chest pain/elevated troponin.  Patient currently denies any chest pain or abdomen pain.  Troponin was 0.07 at [facility].     Patient is a chronic smoker smoke a pack a week.     Laboratory showed troponin 0.07, Hemoccult negative, white blood cell 6.1, hemoglobin 7.1- After blood transfusion 10.5., hematocrit 33, platelet 224, sodium is 139, potassium is 3.8, chloride is 103, CO2 is 26, BUN is 19, creatinine is 1.1, GFR is 51, calcium is 9, albumin 3.5, ALT 35, AST 39, total bilirubin .6, total protein 6.2, PTT 22, PT 12, INR is 1.14, .  pH 7.41, PCO2 43, PO2 72, HCO3 27.     Covid-19-not detected.  CTA of the chest-no mediastinal or hilar or axillary lymph node, calcified granuloma in the right lower lobe, lung otherwise clear with no addition pulmonary nodule or mass consolidation or infiltrate or pleural effusion or pneumothorax.   CT scan abdomen pelvic-image of abdomen pelvic-liver and gallbladder and kidneys and adrenal and spleen and pancreas have normal CT appearance, aortic containing atherosclerotic calcification without evidence of aneurysm, uterus is surgically absent, no dilated loop of small bowel, no free air or abscess or free fluid.  Chest x-ray-no acute CHF with minimally bilateral pleural effusion.  Ultrasound evaluation urinary bladder-normal ultrasound evaluation urinary bladder.    Hospital Course:  The patient is a 66 y.o. female who presented to [facility] with elevated troponin/shortness of breath/heart murmur.      Chest pain/shortness of breath/chronic heart murmur/hypertension.  Slightly elevated troponin.  We will continue to trend troponin.  Consult cardiology, reason  for transfer, in a.m.  Patient currently denies any chest pain.  Hold Lasix.  Continue low-dose lisinopril.  Continue Lopressor.  Echocardiogram-ejection fraction 61-65%, moderate concentric hypertrophy, mild to moderate aortic valve stenosis, moderate mitral valve regurgitation, tricuspid valve regurgitation.  Patient will go home with aspirin a day.     Shortness of breath/bronchitis.  Patient currently denies any shortness of breath.  CTA of the chest from [facility], [place], operated by [facility] -no mediastinal or hilar or axillary lymph node, calcified granuloma in the right lower lobe, lung otherwise clear with no addition pulmonary nodule or mass consolidation or infiltrate or pleural effusion or pneumothorax.   Chest x-ray -mild bronchial wall thickening may reflect acute or chronic Bronchitis, no evidence of pneumonia.  Patient to home with a Z-Akira antibiotics and DuoNeb treatment 4 times daily.  Patient instructed follow-up PCP in 1 week.    Anemia.  Status post 2 units of blood transfusion 7/7/2021.  No sign of acute bleed.  Hemoccult at [facility], [place], operated by [facility] was negative.  Continue iron sulfate.  Hemoglobin here was 9.8.     Abdomen pain/epigastric pain.  Pepcid.  Zofran as needed  CT scan abdomen pelvic from [facility], [place], operated by [facility] -image of abdomen pelvic-liver and gallbladder and kidneys and adrenal and spleen and pancreas have normal CT appearance, aortic containing atherosclerotic calcification without evidence of aneurysm, uterus is surgically absent, no dilated loop of small bowel, no free air or abscess or free fluid.  Ultrasound evaluation urinary bladder from [facility], [place], operated by [facility] -normal ultrasound evaluation urinary bladder.     Chronic smoker.  Discussed with patient cutting back and stopping.  Nicotine patch for now.     Anxiety.  Clonazepam as needed.     Signs stable, afebrile.  Plan to discharge patient home today.  Discharge home with family.  Follow-up with PCP in 1 week.    Condition on Discharge: Stable.    Physical Exam on  "Discharge:  /71 (BP Location: Right arm, Patient Position: Lying)   Pulse 57   Temp 97.6 °F (36.4 °C) (Oral)   Resp 18   Ht 160 cm (62.99\")   Wt 63.1 kg (139 lb 3.2 oz)   SpO2 94%   BMI 24.66 kg/m²   Physical Exam  Constitutional:       Appearance: She is well-developed.   HENT:      Head: Normocephalic and atraumatic.   Eyes:      Conjunctiva/sclera: Conjunctivae normal.      Pupils: Pupils are equal, round, and reactive to light.   Neck:      Vascular: No JVD.   Cardiovascular:      Rate and Rhythm: Normal rate and regular rhythm.      Heart sounds: Normal heart sounds. No murmur heard.   No friction rub. No gallop.    Pulmonary:      Effort: No respiratory distress.      Breath sounds: No wheezing or rales.      Comments: Diminished breath sound bilateral, clear.  Patient not requiring oxygen.  Chest:      Chest wall: No tenderness.   Abdominal:      General: Bowel sounds are normal. There is no distension.      Palpations: Abdomen is soft.      Tenderness: There is no abdominal tenderness. There is no guarding or rebound.   Musculoskeletal:         General: No tenderness or deformity. Normal range of motion.      Cervical back: Neck supple.   Skin:     General: Skin is warm and dry.      Capillary Refill: Capillary refill takes 2 to 3 seconds.      Findings: No rash.   Neurological:      Mental Status: She is alert and oriented to person, place, and time.      Cranial Nerves: No cranial nerve deficit.      Motor: No abnormal muscle tone.      Deep Tendon Reflexes: Reflexes normal.   Psychiatric:         Behavior: Behavior normal.         Thought Content: Thought content normal.         Judgment: Judgment normal.           Discharge Disposition:  Home or Self Care    Discharge Medications:     Discharge Medications      New Medications      Instructions Start Date   aspirin 81 MG EC tablet   81 mg, Oral, Daily   Start Date: July 11, 2021     famotidine 20 MG tablet  Commonly known as: PEPCID   20 mg, " Oral, 2 Times Daily      ipratropium-albuterol 0.5-2.5 mg/3 ml nebulizer  Commonly known as: DUO-NEB   3 mL, Nebulization, 4 Times Daily - RT, 2 boxes      nicotine 7 MG/24HR patch  Commonly known as: NICODERM CQ   1 patch, Transdermal, Every 24 Hours Scheduled   Start Date: July 11, 2021        Changes to Medications      Instructions Start Date   metoprolol tartrate 25 MG tablet  Commonly known as: LOPRESSOR  What changed:   · how much to take  · when to take this   12.5 mg, Oral, Every 12 Hours Scheduled         Continue These Medications      Instructions Start Date   clonazePAM 0.5 MG tablet  Commonly known as: KlonoPIN   0.5 mg, Oral, 3 Times Daily PRN      ferrous sulfate 325 (65 FE) MG tablet   325 mg, Oral, Daily With Breakfast      lisinopril 5 MG tablet  Commonly known as: PRINIVIL,ZESTRIL   5 mg, Oral, Daily      Loratadine 10 MG capsule   10 mg, Oral, Daily         Stop These Medications    furosemide 20 MG tablet  Commonly known as: LASIX     pantoprazole 40 MG EC tablet  Commonly known as: PROTONIX     potassium chloride 10 MEQ CR tablet            Discharge Diet:   Diet Instructions     Advance Diet As Tolerated            Activity at Discharge:   Activity Instructions     Activity as Tolerated            Discharge Care Plan/Instructions: Discharge home with family.    Follow-up Appointments:   Follow-up with PCP in 1 week.    Electronically signed by Jorge Ahuja MD, 07/10/21, 17:11 CDT.    Time: Greater than 30 minutes.

## 2021-07-10 NOTE — PROGRESS NOTES
[facility] -  Progress Note     LOS: 1 day   Patient Care Team:  Provider, No Known as PCP - General    Chief Complaint: F/U Troponin    Subjective   Patient resting in bed, comfortable. Denies any shortness of breath or chest pain.       REVIEW OF SYSTEMS:  Constitutional: Denies fever or chills. Denies change in energy level or malaise.  HEENT: Denies headaches or visual disturbances. Denies difficulty swallowing or sore throat.  Respiratory: Denies cough or hoarseness. Denies shortness of breath.   Cardiovascular: Denies chest pain or pressure. Denies palpitations. Denies presyncope/syncope. Denies orthopnea/PND. Denies lower extremity edema.   Gastrointestinal: Denies abdominal pain. Denies nausea/vomiting. Denies change in bowel habits or history of recent GI tract blood loss.   Genitourinary: Denies urinary urgency or frequency. Denies dysuria, hematuria.  Musculoskeletal: Denies pain or swelling in joints.  Neurological: Denies paresthesias. Denies headache. Denies seizure or stroke symptoms.  Behavioral/Psych: Denies problems with anxiety or depression.    All other systems are negative except where stated above.      Objective     Vital Sign Min/Max for last 24 hours  Temp  Min: 97.6 °F (36.4 °C)  Max: 98.8 °F (37.1 °C)   BP  Min: 113/61  Max: 134/72   Pulse  Min: 56  Max: 76   Resp  Min: 16  Max: 18   SpO2  Min: 96 %  Max: 99 %   No data recorded   Weight  Min: 62.8 kg (138 lb 6.4 oz)  Max: 63.1 kg (139 lb 3.2 oz)         07/09/21 1931   Weight: 63.1 kg (139 lb 3.2 oz)         Intake/Output Summary (Last 24 hours) at 7/10/2021 1144  Last data filed at 7/10/2021 0759  Gross per 24 hour   Intake 270 ml   Output 400 ml   Net -130 ml         Physical Exam:    General Appearance:    Alert, cooperative, in no acute distress   HEENT:    Normocephalic. Atraumatic. MAURO.   Lungs:     Clear to auscultation, respirations regular, even and unlabored    Heart:    Regular rhythm and normal rate,  normal S1 and S2, 2/6 holosystolic murmur, no gallop, no rub, no click   Abdomen:     Normal bowel sounds, soft, non-tender, non-distended   Extremities:   Moves all extremities, no edema, no cyanosis, no redness   Pulses:   Pulses palpable and equal bilaterally   Skin:   No bleeding, bruising or rash   Neurologic:   Grossly intact       Results Review:   Lab Results (last 72 hours)     Procedure Component Value Units Date/Time    Comprehensive Metabolic Panel [[id number]]  (Abnormal) Collected: 07/10/21 0427    Specimen: Blood Updated: 07/10/21 0508     Glucose 100 mg/dL      BUN 24 mg/dL      Creatinine 1.00 mg/dL      Sodium 138 mmol/L      Potassium 4.8 mmol/L      Chloride 102 mmol/L      CO2 31.0 mmol/L      Calcium 8.7 mg/dL      Total Protein 6.0 g/dL      Albumin 3.60 g/dL      ALT (SGPT) 28 U/L      AST (SGOT) 35 U/L      Alkaline Phosphatase 78 U/L      Total Bilirubin 0.4 mg/dL      eGFR Non African Amer 55 mL/min/1.73      Globulin 2.4 gm/dL      A/G Ratio 1.5 g/dL      BUN/Creatinine Ratio 24.0     Anion Gap 5.0 mmol/L     Narrative:      GFR Normal >60  Chronic Kidney Disease <60  Kidney Failure <15      Lipid Panel [[id number]] Collected: 07/10/21 0427    Specimen: Blood Updated: 07/10/21 0508     Total Cholesterol 147 mg/dL      Triglycerides 90 mg/dL      HDL Cholesterol 47 mg/dL      LDL Cholesterol  83 mg/dL      VLDL Cholesterol 17 mg/dL      LDL/HDL Ratio 1.74    Narrative:      Cholesterol Reference Ranges  (U.S. Department of Health and Human Services ATP III Classifications)    Desirable          <200 mg/dL  Borderline High    200-239 mg/dL  High Risk          >240 mg/dL      Triglyceride Reference Ranges  (U.S. Department of Health and Human Services ATP III Classifications)    Normal           <150 mg/dL  Borderline High  150-199 mg/dL  High             200-499 mg/dL  Very High        >500 mg/dL    HDL Reference Ranges  (U.S. Department of Health and Human Services ATP III  Classifcations)    Low     <40 mg/dl (major risk factor for CHD)  High    >60 mg/dl ('negative' risk factor for CHD)        LDL Reference Ranges  (U.S. Department of Health and Human Services ATP III Classifcations)    Optimal          <100 mg/dL  Near Optimal     100-129 mg/dL  Borderline High  130-159 mg/dL  High             160-189 mg/dL  Very High        >189 mg/dL    TSH [[id number]]  (Normal) Collected: 07/10/21 0427    Specimen: Blood Updated: 07/10/21 0508     TSH 1.870 uIU/mL     Hemoglobin A1c [[id number]]  (Normal) Collected: 07/10/21 0427    Specimen: Blood Updated: 07/10/21 0454     Hemoglobin A1C 4.90 %     Narrative:      Hemoglobin A1C Ranges:    Increased Risk for Diabetes  5.7% to 6.4%  Diabetes                     >= 6.5%  Diabetic Goal                < 7.0%    CBC Auto Differential [[id number]]  (Abnormal) Collected: 07/10/21 0427    Specimen: Blood Updated: 07/10/21 0443     WBC 4.99 10*3/mm3      RBC 3.59 10*6/mm3      Hemoglobin 9.8 g/dL      Hematocrit 31.7 %      MCV 88.3 fL      MCH 27.3 pg      MCHC 30.9 g/dL      RDW 18.3 %      RDW-SD 56.2 fl      MPV 9.6 fL      Platelets 215 10*3/mm3      Neutrophil % 55.5 %      Lymphocyte % 27.1 %      Monocyte % 12.2 %      Eosinophil % 3.8 %      Basophil % 1.0 %      Immature Grans % 0.4 %      Neutrophils, Absolute 2.77 10*3/mm3      Lymphocytes, Absolute 1.35 10*3/mm3      Monocytes, Absolute 0.61 10*3/mm3      Eosinophils, Absolute 0.19 10*3/mm3      Basophils, Absolute 0.05 10*3/mm3      Immature Grans, Absolute 0.02 10*3/mm3      nRBC 0.0 /100 WBC     Troponin [[id number]]  (Normal) Collected: 07/09/21 1927    Specimen: Blood Updated: 07/09/21 1958     Troponin T 0.024 ng/mL     Narrative:      Troponin T Reference Range:  <= 0.03 ng/mL-   Negative for AMI  >0.03 ng/mL-     Abnormal for myocardial necrosis.  Clinicians would have to utilize clinical acumen, EKG, Troponin and serial changes to determine if it is an Acute Myocardial Infarction or  myocardial injury due to an underlying chronic condition.       Results may be falsely decreased if patient taking Biotin.      Comprehensive Metabolic Panel [[id number]]  (Abnormal) Collected: 07/09/21 1731    Specimen: Blood Updated: 07/09/21 1923     Glucose 85 mg/dL      BUN 21 mg/dL      Creatinine 1.06 mg/dL      Sodium 137 mmol/L      Potassium 4.3 mmol/L      Chloride 100 mmol/L      CO2 28.0 mmol/L      Calcium 8.9 mg/dL      Total Protein 6.7 g/dL      Albumin 4.00 g/dL      ALT (SGPT) 32 U/L      AST (SGOT) 39 U/L      Alkaline Phosphatase 102 U/L      Total Bilirubin 0.4 mg/dL      eGFR Non African Amer 52 mL/min/1.73      Globulin 2.7 gm/dL      A/G Ratio 1.5 g/dL      BUN/Creatinine Ratio 19.8     Anion Gap 9.0 mmol/L     Narrative:      GFR Normal >60  Chronic Kidney Disease <60  Kidney Failure <15      Troponin [[id number]]  (Normal) Collected: 07/09/21 1731    Specimen: Blood Updated: 07/09/21 1921     Troponin T 0.026 ng/mL     Narrative:      Troponin T Reference Range:  <= 0.03 ng/mL-   Negative for AMI  >0.03 ng/mL-     Abnormal for myocardial necrosis.  Clinicians would have to utilize clinical acumen, EKG, Troponin and serial changes to determine if it is an Acute Myocardial Infarction or myocardial injury due to an underlying chronic condition.       Results may be falsely decreased if patient taking Biotin.      CBC Auto Differential [[id number]]  (Abnormal) Collected: 07/09/21 1731    Specimen: Blood Updated: 07/09/21 1857     WBC 5.91 10*3/mm3      RBC 3.81 10*6/mm3      Hemoglobin 10.6 g/dL      Hematocrit 33.2 %      MCV 87.1 fL      MCH 27.8 pg      MCHC 31.9 g/dL      RDW 18.4 %      RDW-SD 52.4 fl      MPV 10.6 fL      Platelets 269 10*3/mm3      Neutrophil % 62.2 %      Lymphocyte % 24.7 %      Monocyte % 9.6 %      Eosinophil % 2.0 %      Basophil % 1.0 %      Immature Grans % 0.5 %      Neutrophils, Absolute 3.67 10*3/mm3      Lymphocytes, Absolute 1.46 10*3/mm3      Monocytes,  Absolute 0.57 10*3/mm3      Eosinophils, Absolute 0.12 10*3/mm3      Basophils, Absolute 0.06 10*3/mm3      Immature Grans, Absolute 0.03 10*3/mm3      nRBC 0.0 /100 WBC               2D Echocardiogram:  Completed, report pending.        Medication Review: yes  Current Facility-Administered Medications   Medication Dose Route Frequency Provider Last Rate Last Admin   • aspirin EC tablet 81 mg  81 mg Oral Daily Jorge Ahuja MD   81 mg at 07/10/21 0834   • clonazePAM (KlonoPIN) tablet 0.5 mg  0.5 mg Oral TID PRN Jorge Ahuja MD       • famotidine (PEPCID) tablet 20 mg  20 mg Oral BID Jorge Ahuja MD   20 mg at 07/10/21 0834   • ferrous sulfate tablet 325 mg  325 mg Oral Daily With Breakfast Jorge Ahuja MD   325 mg at 07/10/21 0834   • ipratropium-albuterol (DUO-NEB) nebulizer solution 3 mL  3 mL Nebulization 4x Daily - RT Jorge Ahuja MD   3 mL at 07/10/21 1052   • lisinopril (PRINIVIL,ZESTRIL) tablet 5 mg  5 mg Oral Daily Jorge Ahuja MD   5 mg at 07/10/21 0834   • metoprolol tartrate (LOPRESSOR) tablet 12.5 mg  12.5 mg Oral Q12H Jorge Ahuja MD   12.5 mg at 07/10/21 0834   • nicotine (NICODERM CQ) 7 MG/24HR patch 1 patch  1 patch Transdermal Q24H Jorge Ahuja MD   1 patch at 07/10/21 0835   • ondansetron (ZOFRAN) injection 4 mg  4 mg Intravenous Q6H PRN Jorge Ahuja MD       • sodium chloride 0.9 % flush 10 mL  10 mL Intravenous Q12H Jorge Ahuja MD   10 mL at 07/09/21 2034   • sodium chloride 0.9 % flush 10 mL  10 mL Intravenous PRN Jorge Ahuja MD             Assessment/Plan       Elevated troponin - Repeat Here were Negative. Patient without complaints of chest pain.     Valvular Heart Disease - MR, MS, and AS    Symptomatic anemia - Per Primary    Reflux gastritis - Per Primary    Patient is stable from cardiac standpoint. Cardiology will sign off. Call again if needed.       Lauren Gaspar, CHRISTOPHER  07/10/21  11:44 CDT

## 2021-07-11 LAB
QT INTERVAL: 466 MS
QTC INTERVAL: 492 MS